# Patient Record
Sex: FEMALE | Race: OTHER | NOT HISPANIC OR LATINO | URBAN - METROPOLITAN AREA
[De-identification: names, ages, dates, MRNs, and addresses within clinical notes are randomized per-mention and may not be internally consistent; named-entity substitution may affect disease eponyms.]

---

## 2017-03-15 ENCOUNTER — EMERGENCY (EMERGENCY)
Facility: HOSPITAL | Age: 47
LOS: 1 days | Discharge: LEFT BEFORE DISCHARGE | End: 2017-03-15
Admitting: EMERGENCY MEDICINE

## 2017-03-15 VITALS
TEMPERATURE: 98 F | HEART RATE: 80 BPM | OXYGEN SATURATION: 98 % | SYSTOLIC BLOOD PRESSURE: 158 MMHG | RESPIRATION RATE: 16 BRPM | DIASTOLIC BLOOD PRESSURE: 91 MMHG

## 2017-03-15 NOTE — ED ADULT TRIAGE NOTE - CHIEF COMPLAINT QUOTE
states" I have chills and pain to my left side and to my right upper back since more than a week"  seen by PMD with antibiotics with no relief. c/o burning on urination.

## 2018-10-24 ENCOUNTER — INPATIENT (INPATIENT)
Facility: HOSPITAL | Age: 48
LOS: 1 days | Discharge: HOME | DRG: 287 | End: 2018-10-26
Attending: HOSPITALIST | Admitting: HOSPITALIST
Payer: MEDICAID

## 2018-10-24 VITALS
HEIGHT: 66 IN | OXYGEN SATURATION: 100 % | WEIGHT: 160.06 LBS | HEART RATE: 70 BPM | SYSTOLIC BLOOD PRESSURE: 154 MMHG | RESPIRATION RATE: 19 BRPM | DIASTOLIC BLOOD PRESSURE: 80 MMHG | TEMPERATURE: 98 F

## 2018-10-24 LAB
ALBUMIN SERPL ELPH-MCNC: 4.7 G/DL — SIGNIFICANT CHANGE UP (ref 3.3–5)
ALP SERPL-CCNC: 50 U/L — SIGNIFICANT CHANGE UP (ref 40–120)
ALT FLD-CCNC: 12 U/L — SIGNIFICANT CHANGE UP (ref 10–45)
ANION GAP SERPL CALC-SCNC: 12 MMOL/L — SIGNIFICANT CHANGE UP (ref 5–17)
APTT BLD: 30.5 SEC — SIGNIFICANT CHANGE UP (ref 27.5–37.4)
AST SERPL-CCNC: 16 U/L — SIGNIFICANT CHANGE UP (ref 10–40)
BASE EXCESS BLDV CALC-SCNC: 1.3 MMOL/L — SIGNIFICANT CHANGE UP (ref -2–2)
BILIRUB SERPL-MCNC: 0.3 MG/DL — SIGNIFICANT CHANGE UP (ref 0.2–1.2)
BUN SERPL-MCNC: 14 MG/DL — SIGNIFICANT CHANGE UP (ref 7–23)
CA-I SERPL-SCNC: 1.34 MMOL/L — HIGH (ref 1.12–1.3)
CALCIUM SERPL-MCNC: 11 MG/DL — HIGH (ref 8.4–10.5)
CHLORIDE BLDV-SCNC: 107 MMOL/L — SIGNIFICANT CHANGE UP (ref 96–108)
CHLORIDE SERPL-SCNC: 103 MMOL/L — SIGNIFICANT CHANGE UP (ref 96–108)
CO2 BLDV-SCNC: 30 MMOL/L — SIGNIFICANT CHANGE UP (ref 22–30)
CO2 SERPL-SCNC: 24 MMOL/L — SIGNIFICANT CHANGE UP (ref 22–31)
CREAT SERPL-MCNC: 0.61 MG/DL — SIGNIFICANT CHANGE UP (ref 0.5–1.3)
GAS PNL BLDV: 135 MMOL/L — LOW (ref 136–145)
GAS PNL BLDV: SIGNIFICANT CHANGE UP
GLUCOSE BLDV-MCNC: 87 MG/DL — SIGNIFICANT CHANGE UP (ref 70–99)
GLUCOSE SERPL-MCNC: 96 MG/DL — SIGNIFICANT CHANGE UP (ref 70–99)
HCO3 BLDV-SCNC: 28 MMOL/L — SIGNIFICANT CHANGE UP (ref 21–29)
HCT VFR BLD CALC: 47.7 % — HIGH (ref 34.5–45)
HCT VFR BLDA CALC: 48 % — SIGNIFICANT CHANGE UP (ref 39–50)
HGB BLD CALC-MCNC: 15.8 G/DL — HIGH (ref 11.5–15.5)
HGB BLD-MCNC: 15.8 G/DL — HIGH (ref 11.5–15.5)
INR BLD: 0.89 RATIO — SIGNIFICANT CHANGE UP (ref 0.88–1.16)
LACTATE BLDV-MCNC: 1 MMOL/L — SIGNIFICANT CHANGE UP (ref 0.7–2)
MCHC RBC-ENTMCNC: 30.5 PG — SIGNIFICANT CHANGE UP (ref 27–34)
MCHC RBC-ENTMCNC: 33.1 GM/DL — SIGNIFICANT CHANGE UP (ref 32–36)
MCV RBC AUTO: 92.2 FL — SIGNIFICANT CHANGE UP (ref 80–100)
PCO2 BLDV: 53 MMHG — HIGH (ref 35–50)
PH BLDV: 7.34 — LOW (ref 7.35–7.45)
PLATELET # BLD AUTO: 311 K/UL — SIGNIFICANT CHANGE UP (ref 150–400)
PO2 BLDV: 23 MMHG — LOW (ref 25–45)
POTASSIUM BLDV-SCNC: 4 MMOL/L — SIGNIFICANT CHANGE UP (ref 3.5–5.3)
POTASSIUM SERPL-MCNC: 4.3 MMOL/L — SIGNIFICANT CHANGE UP (ref 3.5–5.3)
POTASSIUM SERPL-SCNC: 4.3 MMOL/L — SIGNIFICANT CHANGE UP (ref 3.5–5.3)
PROT SERPL-MCNC: 7.4 G/DL — SIGNIFICANT CHANGE UP (ref 6–8.3)
PROTHROM AB SERPL-ACNC: 9.7 SEC — LOW (ref 9.8–12.7)
RBC # BLD: 5.17 M/UL — SIGNIFICANT CHANGE UP (ref 3.8–5.2)
RBC # FLD: 13 % — SIGNIFICANT CHANGE UP (ref 10.3–14.5)
SAO2 % BLDV: 45 % — LOW (ref 67–88)
SODIUM SERPL-SCNC: 139 MMOL/L — SIGNIFICANT CHANGE UP (ref 135–145)
TROPONIN T, HIGH SENSITIVITY RESULT: 10 NG/L — SIGNIFICANT CHANGE UP (ref 0–51)
TROPONIN T, HIGH SENSITIVITY RESULT: 11 NG/L — SIGNIFICANT CHANGE UP (ref 0–51)
WBC # BLD: 10 K/UL — SIGNIFICANT CHANGE UP (ref 3.8–10.5)
WBC # FLD AUTO: 10 K/UL — SIGNIFICANT CHANGE UP (ref 3.8–10.5)

## 2018-10-24 PROCEDURE — 71046 X-RAY EXAM CHEST 2 VIEWS: CPT | Mod: 26

## 2018-10-24 PROCEDURE — 99220: CPT

## 2018-10-24 PROCEDURE — 93010 ELECTROCARDIOGRAM REPORT: CPT

## 2018-10-24 RX ORDER — SODIUM CHLORIDE 9 MG/ML
3 INJECTION INTRAMUSCULAR; INTRAVENOUS; SUBCUTANEOUS EVERY 8 HOURS
Refills: 0 | Status: DISCONTINUED | OUTPATIENT
Start: 2018-10-24 | End: 2018-10-26

## 2018-10-24 RX ORDER — SODIUM CHLORIDE 9 MG/ML
1000 INJECTION INTRAMUSCULAR; INTRAVENOUS; SUBCUTANEOUS ONCE
Refills: 0 | Status: COMPLETED | OUTPATIENT
Start: 2018-10-24 | End: 2018-10-24

## 2018-10-24 RX ORDER — ASPIRIN/CALCIUM CARB/MAGNESIUM 324 MG
325 TABLET ORAL ONCE
Refills: 0 | Status: COMPLETED | OUTPATIENT
Start: 2018-10-24 | End: 2018-10-24

## 2018-10-24 RX ADMIN — Medication 325 MILLIGRAM(S): at 23:40

## 2018-10-24 RX ADMIN — SODIUM CHLORIDE 1000 MILLILITER(S): 9 INJECTION INTRAMUSCULAR; INTRAVENOUS; SUBCUTANEOUS at 23:41

## 2018-10-24 RX ADMIN — SODIUM CHLORIDE 3 MILLILITER(S): 9 INJECTION INTRAMUSCULAR; INTRAVENOUS; SUBCUTANEOUS at 22:37

## 2018-10-24 NOTE — ED CDU PROVIDER DISPOSITION NOTE - CLINICAL COURSE
48 yo female with no PMHx p/w cp and sob.  The patient reports that she has had worsening SOB x 1 week and now with CP for the last 4 days.  Symptoms are worse with exertion and deep breath.  Associated with intermittent cough.  CP is b/l upper chest radiating to the neck and jaw, ache, pressing and tightness, was a/w nausea and diaphoresis last night, was 8/10 currently 2/10. Denies fevers/chills, lightheadedness, dizziness, palpitations, tearing/ripping pain, abd pain, n/v/d/c, dysuria, recent URI, recent long car/plane rides, hormonal preparations, CA, personal fh/o DVT/PE.  Patient is a daily smoker (1ppd x 30+ years).  Strong family hx of CAD with father and sister with MI in their 40s, father later  of MI during stress test. No previous cardiac work up.  PCP: Dr. Patel  ED Course: CBC, CMP, Coags, VBG all non-acitonable. HST 11 --> 10 (2 hr), CXR unremarkable. Pt sent to CDU for tele, CTC, freq eval, and pain control.  CTC in CDU _______________. 48 yo female with no PMHx p/w cp and sob.  The patient reports that she has had worsening SOB x 1 week and now with CP for the last 4 days.  Symptoms are worse with exertion and deep breath.  Associated with intermittent cough.  CP is b/l upper chest radiating to the neck and jaw, ache, pressing and tightness, was a/w nausea and diaphoresis last night, was 8/10 currently 2/10. Denies fevers/chills, lightheadedness, dizziness, palpitations, tearing/ripping pain, abd pain, n/v/d/c, dysuria, recent URI, recent long car/plane rides, hormonal preparations, CA, personal fh/o DVT/PE.  Patient is a daily smoker (1ppd x 30+ years).  Strong family hx of CAD with father and sister with MI in their 40s, father later  of MI during stress test. No previous cardiac work up.  PCP: Dr. Patel  ED Course: CBC, CMP, Coags, VBG all non-acitonable. HST 11 --> 10 (2 hr), CXR unremarkable. Pt sent to CDU for tele, CTC, freq eval, and pain control.  CTC in CDU, 60-70% plaque in LAD. discussed with Cardiologist, pt to be admitted for CATH tomorrow.

## 2018-10-24 NOTE — ED PROVIDER NOTE - ATTENDING CONTRIBUTION TO CARE
46 yo female with no PMHx p/w cp and sob.  The patient reports that she has had worsening SOB x 1 week and now with Chest tightness for the last 4 days, heavy smoker, family history, ekg nl, labs nl, to admit to cdu for ct coronary vs provacative testing.

## 2018-10-24 NOTE — ED ADULT NURSE REASSESSMENT NOTE - NS ED NURSE REASSESS COMMENT FT1
19.00 Received the pt from main ED for the management of CP pt is oriented to the unit meals provided  safety  & comfort measures initiated  Continue to monitor
Pt received from SABRINA Morris. Pt oriented to CDU & plan of care was discussed. Pt endorses 2/10 chest pain described as pressure as if someone was sitting on the chest. MARIA C Sierra aware. Pt also endorses SOB. Pt denies any dizziness or palpitations. Safety & comfort measures maintained. Call bell in reach. Will continue to monitor.

## 2018-10-24 NOTE — ED CDU PROVIDER INITIAL DAY NOTE - PHYSICAL EXAMINATION
GEN: Pt in NAD, A&O x3.   EYES: Sclera white w/o injection, PERRLA.   ENT: Head NCAT. Nose without deformity, turbinates without edema or erythema, no DC. No auricular TTP. Mouth and pharynx without erythema or exudates, uvula midline, no tonsillar enlargement. Neck supple FROM.   RESP: No chest wall tenderness, CTA b/l, no wheezes, rales, or rhonchi.   CARDIAC: RRR, clear distinct S1, S2, no S3, S4, murmurs, gallops, or rubs.   ABD: Abdomen non-distended, soft, non-tender, no rebound or guarding, organomegaly or masses. No CVAT b/l.  VASC: 2+ carotid, radial, and dorsalis pedis pulses b/l. No edema or tenderness of the lower extremities.  NEURO: No focal sensory or neuro deficits.  SKIN: No rashes on the trunk.

## 2018-10-24 NOTE — ED ADULT NURSE NOTE - OBJECTIVE STATEMENT
47y f pt current smoker c/o waking this am with a feeling of pressure on her chest; + sob; + intermittent diaphoresis; pt also states has a family hx of early cardiac events; pt has no personal hx of cardiac events; aox3; no resp distress; no sob at this time; no diaphoresis at this time; pt states still having chest pain; now radiating to left arm; + cough without production; no congestion; denies fever/chills; denies numbness/tingling; ekg done; pt placed on cardiac monitor; nsr on monitor; iv placed; labs drawn; pt states took 325mg of aspirin last night; no aspirin today; family at bedside; safety/comfort maintained

## 2018-10-24 NOTE — ED CDU PROVIDER DISPOSITION NOTE - ATTENDING CONTRIBUTION TO CARE
Attending MD Iyer:   I personally have seen and examined this patient.  Physician assistant note reviewed and agree on plan of care and except where noted.  See below for details.     Seen in CDU 8    47F with no reported PMH, active smoker sent to the CDU for CT coronary and telemetry after presenting to the ED with chest pain and shortness of breath, worsened with exertion.  Patient seen in the CDU preceding and following CT coronary.  At time of evaluation prior to CT coronary both patient and family demanding to know exact time of CT Coronary or threatening to leave.  Explained to patient that unable to provide her an exact time and she would have to leave AMA if she did leave.  Patient still reporting chest tightness/chest pain and shortness of breath prior to CT coronary.  Reports strong family history.  Declines discussion about quitting tobacco at time of my evaluation.  Reports chest tightness is unchanged from that which brought her in and worsens with exertion.  On exam, NAD, AAOx3, head NCAT, PERRL, FROM at neck, no tenderness to midline palpation, no stepoffs along length of spine, lungs with scattered wheezing bilaterally with good inspiratory effort, +S1S2, no m/r/g, abdomen soft with +BS, NT, ND, no CVAT, moving all extremities with 5/5 strength bilateral upper and lower extremities, good and equal  strength bilaterally, no lower extremity edema, +2 distal pulses; A/P: 47F with chest tightness and shortness of breath, worse with exertion, declined neb for wheezing, reports wheezes at baseline.  Discussed findings of labs and radiology with patient after CT coronary. Seen by cardiology. CT coronary abnormal, amenable to admission for cath.

## 2018-10-24 NOTE — ED CDU PROVIDER INITIAL DAY NOTE - ATTENDING CONTRIBUTION TO CARE
46 yo female with no PMHx p/w cp and sob.  pt signed out to me for plan to send to cdu for ct coronary, cardiac monitoring.

## 2018-10-24 NOTE — ED CDU PROVIDER INITIAL DAY NOTE - OBJECTIVE STATEMENT
48 yo female with no PMHx p/w cp and sob.  The patient reports that she has had worsening SOB x 1 week and now with CP for the last 4 days.  Symptoms are worse with exertion and deep breath.  Associated with intermittent cough.  CP is b/l upper chest radiating to the neck and jaw, ache, pressing and tightness, was a/w nausea and diaphoresis last night, was 8/10 currently 2/10. Denies fevers/chills, lightheadedness, dizziness, palpitations, tearing/ripping pain, abd pain, n/v/d/c, dysuria, recent URI, recent long car/plane rides, hormonal preparations, CA, personal fh/o DVT/PE.  Patient is a daily smoker (1ppd x 30+ years).  Strong family hx of CAD with father and sister with MI in their 40s, father later  of MI during stress test. No previous cardiac work up.  PCP: Dr. Patel  ED Course: CBC, CMP, Coags, VBG all non-acitonable. HST 11 --> 10 (2 hr), CXR unremarkable. Pt sent to CDU for tele, CTC, freq eval, and pain control

## 2018-10-24 NOTE — ED ADULT TRIAGE NOTE - CHIEF COMPLAINT QUOTE
chest pain/sob x 1 wk, worse x 3 days, +smoker, (denies long distance travel/not on hormone therapy)

## 2018-10-24 NOTE — ED ADULT NURSE NOTE - NSIMPLEMENTINTERV_GEN_ALL_ED
Implemented All Universal Safety Interventions:  [place] to call system. Call bell, personal items and telephone within reach. Instruct patient to call for assistance. Room bathroom lighting operational. Non-slip footwear when patient is off stretcher. Physically safe environment: no spills, clutter or unnecessary equipment. Stretcher in lowest position, wheels locked, appropriate side rails in place.

## 2018-10-24 NOTE — ED PROVIDER NOTE - OBJECTIVE STATEMENT
48 yo female with no PMHx p/w cp and sob.  The patient reports that she has had worsening SOB x 1 week and now with Chest tightness for the last 4 days.  Symptoms are worse with exertion.  Associated with intermittent cough.  Denies fevers/chills, abd pain, NVDC, dysuria.  Patient is a daily smoker (1ppd x 30+ years).  Strong family hx of CAD with father and sister with MI in their 40s. No previous cardiac work up.

## 2018-10-24 NOTE — ED CDU PROVIDER DISPOSITION NOTE - PLAN OF CARE
1. Recommend Aspirin 81mg over the counter daily until further evaluation.    2. Follow up with your PMD and cardiologist or our cardiology clinic ([phone]) within 48-72 hours. Show copies of your reports given to you.   3. Worsening or continued chest pain, shortness of breath, weakness, return to ED.

## 2018-10-25 DIAGNOSIS — R07.9 CHEST PAIN, UNSPECIFIED: ICD-10-CM

## 2018-10-25 DIAGNOSIS — E83.52 HYPERCALCEMIA: ICD-10-CM

## 2018-10-25 DIAGNOSIS — Z29.9 ENCOUNTER FOR PROPHYLACTIC MEASURES, UNSPECIFIED: ICD-10-CM

## 2018-10-25 DIAGNOSIS — R03.0 ELEVATED BLOOD-PRESSURE READING, WITHOUT DIAGNOSIS OF HYPERTENSION: ICD-10-CM

## 2018-10-25 DIAGNOSIS — I25.10 ATHEROSCLEROTIC HEART DISEASE OF NATIVE CORONARY ARTERY WITHOUT ANGINA PECTORIS: ICD-10-CM

## 2018-10-25 DIAGNOSIS — F17.200 NICOTINE DEPENDENCE, UNSPECIFIED, UNCOMPLICATED: ICD-10-CM

## 2018-10-25 LAB
CHOLEST SERPL-MCNC: 177 MG/DL — SIGNIFICANT CHANGE UP (ref 10–199)
HBA1C BLD-MCNC: 5.5 % — SIGNIFICANT CHANGE UP (ref 4–5.6)
HDLC SERPL-MCNC: 47 MG/DL — LOW
LIPID PNL WITH DIRECT LDL SERPL: 108 MG/DL — SIGNIFICANT CHANGE UP
TOTAL CHOLESTEROL/HDL RATIO MEASUREMENT: 3.8 RATIO — SIGNIFICANT CHANGE UP (ref 3.3–7.1)
TRIGL SERPL-MCNC: 111 MG/DL — SIGNIFICANT CHANGE UP (ref 10–149)

## 2018-10-25 PROCEDURE — 75574 CT ANGIO HRT W/3D IMAGE: CPT | Mod: 26

## 2018-10-25 PROCEDURE — 99223 1ST HOSP IP/OBS HIGH 75: CPT

## 2018-10-25 PROCEDURE — 99217: CPT

## 2018-10-25 PROCEDURE — 93306 TTE W/DOPPLER COMPLETE: CPT | Mod: 26

## 2018-10-25 RX ORDER — NICOTINE POLACRILEX 2 MG
1 GUM BUCCAL DAILY
Refills: 0 | Status: DISCONTINUED | OUTPATIENT
Start: 2018-10-25 | End: 2018-10-26

## 2018-10-25 RX ORDER — ENOXAPARIN SODIUM 100 MG/ML
40 INJECTION SUBCUTANEOUS EVERY 24 HOURS
Refills: 0 | Status: DISCONTINUED | OUTPATIENT
Start: 2018-10-25 | End: 2018-10-26

## 2018-10-25 RX ORDER — METOPROLOL TARTRATE 50 MG
12.5 TABLET ORAL
Refills: 0 | Status: DISCONTINUED | OUTPATIENT
Start: 2018-10-25 | End: 2018-10-26

## 2018-10-25 RX ORDER — ISOSORBIDE MONONITRATE 60 MG/1
30 TABLET, EXTENDED RELEASE ORAL DAILY
Refills: 0 | Status: DISCONTINUED | OUTPATIENT
Start: 2018-10-25 | End: 2018-10-25

## 2018-10-25 RX ORDER — AMLODIPINE BESYLATE 2.5 MG/1
5 TABLET ORAL DAILY
Refills: 0 | Status: DISCONTINUED | OUTPATIENT
Start: 2018-10-25 | End: 2018-10-26

## 2018-10-25 RX ORDER — METOPROLOL TARTRATE 50 MG
50 TABLET ORAL ONCE
Refills: 0 | Status: COMPLETED | OUTPATIENT
Start: 2018-10-25 | End: 2018-10-25

## 2018-10-25 RX ORDER — ATORVASTATIN CALCIUM 80 MG/1
80 TABLET, FILM COATED ORAL DAILY
Refills: 0 | Status: DISCONTINUED | OUTPATIENT
Start: 2018-10-25 | End: 2018-10-26

## 2018-10-25 RX ORDER — ASPIRIN/CALCIUM CARB/MAGNESIUM 324 MG
81 TABLET ORAL DAILY
Refills: 0 | Status: DISCONTINUED | OUTPATIENT
Start: 2018-10-25 | End: 2018-10-26

## 2018-10-25 RX ORDER — DIPHENHYDRAMINE HCL 50 MG
50 CAPSULE ORAL ONCE
Refills: 0 | Status: COMPLETED | OUTPATIENT
Start: 2018-10-25 | End: 2018-10-25

## 2018-10-25 RX ADMIN — SODIUM CHLORIDE 3 MILLILITER(S): 9 INJECTION INTRAMUSCULAR; INTRAVENOUS; SUBCUTANEOUS at 08:12

## 2018-10-25 RX ADMIN — AMLODIPINE BESYLATE 5 MILLIGRAM(S): 2.5 TABLET ORAL at 14:25

## 2018-10-25 RX ADMIN — SODIUM CHLORIDE 3 MILLILITER(S): 9 INJECTION INTRAMUSCULAR; INTRAVENOUS; SUBCUTANEOUS at 13:41

## 2018-10-25 RX ADMIN — Medication 50 MILLIGRAM(S): at 03:40

## 2018-10-25 RX ADMIN — ATORVASTATIN CALCIUM 80 MILLIGRAM(S): 80 TABLET, FILM COATED ORAL at 21:35

## 2018-10-25 RX ADMIN — Medication 12.5 MILLIGRAM(S): at 21:35

## 2018-10-25 RX ADMIN — SODIUM CHLORIDE 3 MILLILITER(S): 9 INJECTION INTRAMUSCULAR; INTRAVENOUS; SUBCUTANEOUS at 21:19

## 2018-10-25 RX ADMIN — Medication 50 MILLIGRAM(S): at 09:20

## 2018-10-25 RX ADMIN — Medication 81 MILLIGRAM(S): at 14:25

## 2018-10-25 NOTE — H&P ADULT - NSHPLABSRESULTS_GEN_ALL_CORE
(10-24 @ 16:05)                      15.8  10.0 )-----------( 311                 47.7    Neutrophils = -- (--%)  Lymphocytes = -- (--%)  Eosinophils = -- (--%)  Basophils = -- (--%)  Monocytes = -- (--%)  Bands = --%    10-24    139  |  103  |  14  ----------------------------<  96  4.3   |  24  |  0.61    Ca    11.0<H>      24 Oct 2018 16:05    TPro  7.4  /  Alb  4.7  /  TBili  0.3  /  DBili  x   /  AST  16  /  ALT  12  /  AlkPhos  50  10-24    ( 24 Oct 2018 16:05 )   PT: 9.7 sec;   INR: 0.89 ratio;       PTT:30.5 sec        Venous Blood Gas:  10-24 @ 16:05  7.34/53/23/28/45  VBG Lactate: 1.0    Labs personally reviewed: Hypercalcemia to 11. No significant electrolyte disturbances, no anemia  Imaging personally reviewed: CXR w/ clear lungs, no consolidations or effusions  EKG personally reviewed: NSR 63 bpm w/ flattened T wave in aVL, QTc 364  Consultants' notes reviewed: cardiology

## 2018-10-25 NOTE — H&P ADULT - ASSESSMENT
46 y/o woman w/ no significant PMH but active smoker p/w stable angina and found to have 70% LAD lesion on CT coronaries.

## 2018-10-25 NOTE — H&P ADULT - NSHPREVIEWOFSYSTEMS_GEN_ALL_CORE
General:	No fevers, chills, or decreased appetite. +weight gain    Skin/Breast: No rashes or bruising  	  Ophthalmologic: No change in vision or eye pain  	  ENMT: No sore throat or dry mouth    Respiratory and Thorax: No SOB or cough  	  Cardiovascular: +chest pain, no palpitations or LE edema    Gastrointestinal: +nausea, No abdominal pain, vomiting, or diarrhea    Genitourinary: No dysuria or polyuria    Musculoskeletal: No joint swelling or erythema    Neurological: No headaches or new neurological deficits    Psychiatric: No depression or anxiety    Hematology/Lymphatics: No bruising

## 2018-10-25 NOTE — H&P ADULT - PROBLEM SELECTOR PLAN 3
BP has fluctuated, SBP 100s-150s.   - Started on amlodipine yesterday  - f/u cardiology, depending on HR and results of cath/TTE, may be better to change to ACE-I or even beta blocker BP has fluctuated, SBP 100s-150s.   - Started on amlodipine yesterday  - Start metoprolol as above  - Monitor vitals

## 2018-10-25 NOTE — H&P ADULT - NSHPPHYSICALEXAM_GEN_ALL_CORE
Vital Signs Last 24 Hrs  T(C): 36.3 (25 Oct 2018 12:19), Max: 37.1 (25 Oct 2018 03:45)  T(F): 97.4 (25 Oct 2018 12:19), Max: 98.8 (25 Oct 2018 03:45)  HR: 68 (25 Oct 2018 12:19) (61 - 81)  BP: 155/93 (25 Oct 2018 12:19) (98/66 - 155/93)  BP(mean): --  RR: 17 (25 Oct 2018 12:19) (17 - 19)  SpO2: 97% (25 Oct 2018 12:19) (94% - 100%)    PHYSICAL EXAM:  Constitutional: Well-appearing, NAD, sitting in chair, appears stated age    Eyes: EOMI, PERRLA, clear conjunctiva    ENMT: No pharyngeal erythema, mucus membranes moist    Neck: No JVD    Back: No spinal tenderness or kyphosis    Respiratory: Lungs clear to auscultation     Cardiovascular: Regular rate and rhythm, S1S2+, no murmurs appreciated. No LE edema    Gastrointestinal: Soft, non-tender, non-distended, bowel sounds positive all four quadrants    Extremities: no clubbing or cyanosis    Vascular: 2+ pulses radially and dorsalis pedis    Neurological: Alert and oriented to name, place, and date. Cranial nerves II-XII intact. No focal neurological deficits. 5/5 motor strength all four extremities    Skin: Warm and dry.  No rashes    Lymph Nodes: No cervical lymphadenopathy    Musculoskeletal: No joint swelling or erythema    Psychiatric: Pleasant affect

## 2018-10-25 NOTE — ED CDU PROVIDER SUBSEQUENT DAY NOTE - PROGRESS NOTE DETAILS
CDU NOTE MARIA C HWANG: Pt resting comfortably, feeling well without complaint aside from inability to fall asleep. states lightheadedness resolved. denies CP but states she has a scratching/itchy sensation in chest. NAD, VSS. No events on telemetry. will give benadryl and continue monitoring. CDU NOTE MARIA C Schneider: pt resting comfortably, feels well, no cp, no sob/dyspnea. NAD recent VSS. no events on tele. CV/lungs- +S1, S2, no murmur. lungs- mild diffuse wheeze. pt offered neb treatment, pt declined. pt reports "I am a smoker, that's why I sound like that." pt states she is at her baseline. CDU NOTE MARIA C Schneider: spoke to the Nocturnist Cardiologist, TTE can be done outpt. pt will get CTC here during her stay. CDU NOTE MARIA C Schneider: pt resting comfortably, feels well without complaint. had episode of CP earlier but resolved. no other complaints. no cp now. NAD recent VSS. no events on tele.   pt evaluated by Cardiologist for abnormal CT Coronaries. pt to be admitted for Cath.

## 2018-10-25 NOTE — H&P ADULT - PROBLEM SELECTOR PLAN 2
Patient reports that her PCP told her she had "high calcium" last year. No further workup as far as she knows.   - Check PTH, Vit D-25OH and TSH  - Trend BMP  - Outpatient f/u

## 2018-10-25 NOTE — H&P ADULT - PROBLEM SELECTOR PLAN 4
Patient active smoker. She is contemplating quitting and interested in having more resources to quit.   - Start nicotine patch 21 mg/24 hr    Face-to-face smoking cessation counseling 8 minutes.

## 2018-10-25 NOTE — CONSULT NOTE ADULT - SUBJECTIVE AND OBJECTIVE BOX
CARDIOLOGY CONSULTATION NOTE                                                                                             Consult requested by:  emergency department    Reason for Consultation: chest pain    History obtained by: Patient and medical record     obtained: No    Chief complaint:    Patient is a 47y old  Female who presents with a chief complaint of chest pain x 4 days.     HPI:  47yr old female no pmhx but is an active smoker for 30yrs 1 PPD presents c/o chest pain for last 4 days. States pain feels more like a heaviness all over her chest, radiating to her neck and jaw. Waxing and waning in nature, associated with mild dizziness and nausea. No LOC, dizziness, palpitations, shortness of breath. Patient came to ED today because heaviness awoke her from sleep and was found to be diaphoretic. She is able to climb to two flights of stairs and has been going to work daily.       REVIEW OF SYMPTOMS: Cardiovascular:  See HPI. + chest pain,  No dyspnea,  No syncope,  No palpitations, + dizziness, No Orthopnea,      No Paroxsymal nocturnal dyspnea;  Respiratory:  No Dyspnea, No cough,     Genitourinary:  No dysuria, no hematuria; Gastrointestinal:  + nausea, no vomiting. No diarrhea.  No abdominal pain. No dark color stool, no melena ; Neurological: No headache, no dizziness, no slurred speech;  Psychiatric: No agitation, no anxiety.  ALL OTHER REVIEW OF SYSTEMS ARE NEGATIVE.    ALLERGIES: Allergies    No Known Allergies    Intolerances          CURRENT MEDICATIONS:     sodium chloride 0.9% lock flush      HOME MEDICATIONS:  none    PAST MEDICAL HISTORY  No pertinent past medical history      PAST SURGICAL HISTORY  Delivery by  section of full-term infant      FAMILY HISTORY:  Family history of myocardial infarction (Father 30's, brother 40's, sister 40's)      SOCIAL HISTORY:  active smoker 1ppd x 40yrs        Vital Signs Last 24 Hrs  T(C): 37 (24 Oct 2018 23:28), Max: 37 (24 Oct 2018 18:46)  T(F): 98.6 (24 Oct 2018 23:28), Max: 98.6 (24 Oct 2018 18:46)  HR: 76 (24 Oct 2018 23:28) (61 - 76)  BP: 98/66 (24 Oct 2018 23:28) (98/66 - 154/80)  BP(mean): --  RR: 18 (24 Oct 2018 23:28) (18 - 19)  SpO2: 95% (24 Oct 2018 23:28) (95% - 100%)      PHYSICAL EXAM:  Constitutional: Comfortable . No acute distress.   HEENT: Atraumatic and normcephalic , neck is supple . no JVD. No carotid bruit. PEERL   CNS: A&Ox3. No focal deficits. EOMI.   Lymph Nodes: Cervical : Not palpable.  Respiratory: CTAB  Cardiovascular: S1S2 RRR. No murmur/rubs or gallop.  Gastrointestinal: Soft non-tender and non distended . +Bowel sounds.   Extremities: No edema.   Psychiatric: Calm . no agitation.  Skin: No skin rash/ulcers visualized to face, hands or feet.    Intake and output:     LABS:                        15.8   10.0  )-----------( 311      ( 24 Oct 2018 16:05 )             47.7     10-24    139  |  103  |  14  ----------------------------<  96  4.3   |  24  |  0.61    Ca    11.0<H>      24 Oct 2018 16:05    TPro  7.4  /  Alb  4.7  /  TBili  0.3  /  DBili  x   /  AST  16  /  ALT  12  /  AlkPhos  50  10-      ;p-BNP=  PT/INR - ( 24 Oct 2018 16:05 )   PT: 9.7 sec;   INR: 0.89 ratio         PTT - ( 24 Oct 2018 16:05 )  PTT:30.5 sec      INTERPRETATION OF TELEMETRY: Reviewed by me.   ECG: Reviewed by me. sinus rhythm    RADIOLOGY & ADDITIONAL STUDIES:    X-ray:  reviewed by me. no vascular congestion, effusion, cardiomegaly

## 2018-10-25 NOTE — H&P ADULT - PROBLEM SELECTOR PLAN 1
Evidence of 70% LAD blockage on CT coronaries, which is likely culprit for patient's anginal symptoms over the past few days. Cardiology f/u appreciated.   - Plan for cardiac cath tomorrow   - Start ASA 81 mg daily  - Start lipitor 80 mg qhs  - Check TTE  - Check lipid panel  - Tele monitoring Evidence of 70% LAD blockage on CT coronaries, which is likely culprit for patient's anginal symptoms over the past few days. Cardiology f/u appreciated.   - Plan for cardiac cath tomorrow   - Start ASA 81 mg daily  - Start lipitor 80 mg qhs  - Start metoprolol 12.5 mg BID w/ hold parameters  - Check TTE  - Check lipid panel  - Tele monitoring

## 2018-10-25 NOTE — H&P ADULT - FAMILY HISTORY
Sibling  Still living? Unknown  Family history of myocardial infarction, Age at diagnosis: Age Unknown     Father  Still living? No  Family history of myocardial infarction, Age at diagnosis: Age Unknown

## 2018-10-25 NOTE — H&P ADULT - HISTORY OF PRESENT ILLNESS
48 y/o woman w/ no significant PMH but active smoker p/w progressive chest pressure x 4 days. Patient describes the pain as substernal and occasionally crushing in nature. She never experienced similar symptoms in the past. It is b/l and radiates to her neck. She also endorses new WOO in the same time period, with increasing difficulty climbing up two flights of stairs, which normally causes her no problems. Patient came to the hospital yesterday morning after waking up in the middle of the night with severe chest pain and diaphoresis. She took an aspirin with relief of her symptoms. ROS also notable for nausea and dizziness. Patient initially admitted to CDU for further workup. CT coronaries revealed 70% LAD lesion.    Of note, patient has a strong family h/o CAD. Her father  of sudden cardiac death in his 30s, her brother of sudden cardiac death in his 40s, and her sister had a stent placed at age 52. She smokers ~1/2 PPD for the past 15 years, reports she is interested in quitting.

## 2018-10-25 NOTE — CONSULT NOTE ADULT - ASSESSMENT
47yr old female no pmhx but is an active smoker for 30yrs 1 PPD presents c/o chest pain for last 4 days radiating to neck and jaw.

## 2018-10-25 NOTE — CONSULT NOTE ADULT - ATTENDING COMMENTS
Thank you. My team will follow.    Monika Montana M.D, F.A.C.C  [facility]   [phone]

## 2018-10-25 NOTE — CONSULT NOTE ADULT - PROBLEM SELECTOR RECOMMENDATION 9
HEART Score=4, Coronary CTA r/o CAD. TTE for evaluation of LV function and r/o valvular abnormalities. Continue telemetry monitoring. EKG negative ischemia, cardiac enzymes x 2 negative. Check BNP and fasting lipid profile.

## 2018-10-25 NOTE — ED CDU PROVIDER SUBSEQUENT DAY NOTE - HISTORY
No interval changes since initial CDU provider note. Pt feels well without complaint. No CP now. states she does feel a little lightheaded though. NAD, VSS. no events on tele. trops negative x 2. nocturnist cardio saw pt, agree with CTC for AM. will give IVFs and continue monitoring. - MARIA C Reeves

## 2018-10-25 NOTE — ED CDU PROVIDER SUBSEQUENT DAY NOTE - ATTENDING CONTRIBUTION TO CARE
Attending MD Iyer:   I personally have seen and examined this patient.  Physician assistant note reviewed and agree on plan of care and except where noted.  See below for details.     Seen in CDU 8    47F with no reported PMH, active smoker sent to the CDU for CT coronary and telemetry after presenting to the ED with chest pain and shortness of breath, worsened with exertion.  Patient seen in the CDU preceding and following CT coronary.  At time of evaluation prior to CT coronary both patient and family demanding to know exact time of CT Coronary or threatening to leave.  Explained to patient that unable to provide her an exact time and she would have to leave AMA if she did leave.  Patient still reporting chest tightness/chest pain and shortness of breath prior to CT coronary.  Reports strong family history.  Declines discussion about quitting tobacco at time of my evaluation.  Reports chest tightness is unchanged from that which brought her in and worsens with exertion.  On exam, NAD, AAOx3, head NCAT, PERRL, FROM at neck, no tenderness to midline palpation, no stepoffs along length of spine, lungs with scattered wheezing bilaterally with good inspiratory effort, +S1S2, no m/r/g, abdomen soft with +BS, NT, ND, no CVAT, moving all extremities with 5/5 strength bilateral upper and lower extremities, good and equal  strength bilaterally, no lower extremity edema; A/P: 47F with chest tightness and shortness of breath, worse with exertion, declined neb for wheezing, reports wheezes at baseline.  Discussed findings of labs and radiology with patient after CT coronary. Seen by cardiology. CT coronary abnormal, amenable to admission for cath.

## 2018-10-26 ENCOUNTER — TRANSCRIPTION ENCOUNTER (OUTPATIENT)
Age: 48
End: 2018-10-26

## 2018-10-26 VITALS
TEMPERATURE: 98 F | SYSTOLIC BLOOD PRESSURE: 117 MMHG | HEART RATE: 74 BPM | OXYGEN SATURATION: 96 % | RESPIRATION RATE: 18 BRPM | DIASTOLIC BLOOD PRESSURE: 64 MMHG

## 2018-10-26 LAB
ANION GAP SERPL CALC-SCNC: 11 MMOL/L — SIGNIFICANT CHANGE UP (ref 5–17)
BUN SERPL-MCNC: 18 MG/DL — SIGNIFICANT CHANGE UP (ref 7–23)
CALCIUM SERPL-MCNC: 11 MG/DL — HIGH (ref 8.4–10.5)
CHLORIDE SERPL-SCNC: 108 MMOL/L — SIGNIFICANT CHANGE UP (ref 96–108)
CHOLEST SERPL-MCNC: 197 MG/DL — SIGNIFICANT CHANGE UP (ref 10–199)
CO2 SERPL-SCNC: 23 MMOL/L — SIGNIFICANT CHANGE UP (ref 22–31)
CREAT SERPL-MCNC: 0.51 MG/DL — SIGNIFICANT CHANGE UP (ref 0.5–1.3)
GLUCOSE SERPL-MCNC: 111 MG/DL — HIGH (ref 70–99)
HCG UR QL: NEGATIVE — SIGNIFICANT CHANGE UP
HCT VFR BLD CALC: 45.2 % — HIGH (ref 34.5–45)
HCT VFR BLD CALC: 45.8 % — HIGH (ref 34.5–45)
HDLC SERPL-MCNC: 59 MG/DL — SIGNIFICANT CHANGE UP
HGB BLD-MCNC: 14.3 G/DL — SIGNIFICANT CHANGE UP (ref 11.5–15.5)
HGB BLD-MCNC: 14.6 G/DL — SIGNIFICANT CHANGE UP (ref 11.5–15.5)
LIPID PNL WITH DIRECT LDL SERPL: 113 MG/DL — SIGNIFICANT CHANGE UP
MCHC RBC-ENTMCNC: 29.8 PG — SIGNIFICANT CHANGE UP (ref 27–34)
MCHC RBC-ENTMCNC: 30 PG — SIGNIFICANT CHANGE UP (ref 27–34)
MCHC RBC-ENTMCNC: 31.2 GM/DL — LOW (ref 32–36)
MCHC RBC-ENTMCNC: 32.4 GM/DL — SIGNIFICANT CHANGE UP (ref 32–36)
MCV RBC AUTO: 92.6 FL — SIGNIFICANT CHANGE UP (ref 80–100)
MCV RBC AUTO: 95.4 FL — SIGNIFICANT CHANGE UP (ref 80–100)
PLATELET # BLD AUTO: 285 K/UL — SIGNIFICANT CHANGE UP (ref 150–400)
PLATELET # BLD AUTO: 311 K/UL — SIGNIFICANT CHANGE UP (ref 150–400)
POTASSIUM SERPL-MCNC: 4.6 MMOL/L — SIGNIFICANT CHANGE UP (ref 3.5–5.3)
POTASSIUM SERPL-SCNC: 4.6 MMOL/L — SIGNIFICANT CHANGE UP (ref 3.5–5.3)
RBC # BLD: 4.8 M/UL — SIGNIFICANT CHANGE UP (ref 3.8–5.2)
RBC # BLD: 4.88 M/UL — SIGNIFICANT CHANGE UP (ref 3.8–5.2)
RBC # FLD: 13.1 % — SIGNIFICANT CHANGE UP (ref 10.3–14.5)
RBC # FLD: 14.3 % — SIGNIFICANT CHANGE UP (ref 10.3–14.5)
SODIUM SERPL-SCNC: 142 MMOL/L — SIGNIFICANT CHANGE UP (ref 135–145)
TOTAL CHOLESTEROL/HDL RATIO MEASUREMENT: 3.3 RATIO — SIGNIFICANT CHANGE UP (ref 3.3–7.1)
TRIGL SERPL-MCNC: 123 MG/DL — SIGNIFICANT CHANGE UP (ref 10–149)
WBC # BLD: 13.27 K/UL — HIGH (ref 3.8–10.5)
WBC # BLD: 16.6 K/UL — HIGH (ref 3.8–10.5)
WBC # FLD AUTO: 13.27 K/UL — HIGH (ref 3.8–10.5)
WBC # FLD AUTO: 16.6 K/UL — HIGH (ref 3.8–10.5)

## 2018-10-26 PROCEDURE — 99239 HOSP IP/OBS DSCHRG MGMT >30: CPT

## 2018-10-26 PROCEDURE — 99232 SBSQ HOSP IP/OBS MODERATE 35: CPT

## 2018-10-26 PROCEDURE — 93458 L HRT ARTERY/VENTRICLE ANGIO: CPT | Mod: 26,GC

## 2018-10-26 PROCEDURE — 93571 IV DOP VEL&/PRESS C FLO 1ST: CPT | Mod: 26,LD,GC

## 2018-10-26 PROCEDURE — 99152 MOD SED SAME PHYS/QHP 5/>YRS: CPT | Mod: GC

## 2018-10-26 RX ORDER — METOPROLOL TARTRATE 50 MG
0.5 TABLET ORAL
Qty: 30 | Refills: 0
Start: 2018-10-26 | End: 2018-11-24

## 2018-10-26 RX ORDER — NICOTINE POLACRILEX 2 MG
1 GUM BUCCAL
Qty: 30 | Refills: 0
Start: 2018-10-26 | End: 2018-11-24

## 2018-10-26 RX ORDER — AMLODIPINE BESYLATE 2.5 MG/1
1 TABLET ORAL
Qty: 30 | Refills: 0
Start: 2018-10-26 | End: 2018-11-24

## 2018-10-26 RX ORDER — ATORVASTATIN CALCIUM 80 MG/1
1 TABLET, FILM COATED ORAL
Qty: 30 | Refills: 0
Start: 2018-10-26 | End: 2018-11-24

## 2018-10-26 RX ORDER — MORPHINE SULFATE 50 MG/1
2 CAPSULE, EXTENDED RELEASE ORAL ONCE
Refills: 0 | Status: DISCONTINUED | OUTPATIENT
Start: 2018-10-26 | End: 2018-10-26

## 2018-10-26 RX ORDER — ASPIRIN/CALCIUM CARB/MAGNESIUM 324 MG
1 TABLET ORAL
Qty: 0 | Refills: 0 | DISCHARGE
Start: 2018-10-26

## 2018-10-26 RX ADMIN — AMLODIPINE BESYLATE 5 MILLIGRAM(S): 2.5 TABLET ORAL at 05:07

## 2018-10-26 RX ADMIN — Medication 81 MILLIGRAM(S): at 11:47

## 2018-10-26 RX ADMIN — Medication 12.5 MILLIGRAM(S): at 05:07

## 2018-10-26 RX ADMIN — MORPHINE SULFATE 2 MILLIGRAM(S): 50 CAPSULE, EXTENDED RELEASE ORAL at 07:32

## 2018-10-26 RX ADMIN — MORPHINE SULFATE 2 MILLIGRAM(S): 50 CAPSULE, EXTENDED RELEASE ORAL at 07:02

## 2018-10-26 RX ADMIN — SODIUM CHLORIDE 3 MILLILITER(S): 9 INJECTION INTRAMUSCULAR; INTRAVENOUS; SUBCUTANEOUS at 05:04

## 2018-10-26 NOTE — PROGRESS NOTE ADULT - SUBJECTIVE AND OBJECTIVE BOX
Brief Cardiology note:    CT-Coronary:  IMPRESSION:     CT coronary angiography shows:  LMCA: Tiny eccentric calcified plaque at the ostium resulting in no   associated luminal narrowing.  LAD: Eccentric noncalcified plaque within the proximal segment resulting   in moderate (60-70%) luminal narrowing.  LCx: Patent.  RCA: Dominant vessel, with noncalcified plaque within the proximal   segment resulting in minimal (less than 30%) luminal narrowing.      -CT-Coronary results noted. Eccentric lesion ~60-70% at prox LAD and myocardial bridging  -Patient with persistent chest pain (2/10) at this time  -Discussed with patient and will plan for LHC tomorrow AM.  -Please admit to hospital medicine  -Please start Atorvastatin 80 mg po QHS  -Cont ASA 81 mg po QD  -Start Metoprolol 12.5 mg po BID  -Start Norvasc 5 mg po QD  -Please order routine TTE  -Please keep NPO after midnight for LHC. Will discuss with interventional team.    Thank you for this interesting consult. My team will continue to follow along with you.    Rodolfo Calvin MD  Cardiology Attending  [facility] / [facility]   Cell: [phone]  (Cardiology Nocturnist cell number available 7 pm - 7 am every night; available daytime week days for follow-up only; daytime weekends covered by general cardiology consult service)
SUBJ: no further Cp, no SOB, plan for cardiac cath today    MEDICATIONS  (STANDING):  amLODIPine   Tablet 5 milliGRAM(s) Oral daily  aspirin enteric coated 81 milliGRAM(s) Oral daily  atorvastatin 80 milliGRAM(s) Oral daily  enoxaparin Injectable 40 milliGRAM(s) SubCutaneous every 24 hours  metoprolol tartrate 12.5 milliGRAM(s) Oral two times a day  nicotine - 21 mG/24Hr(s) Patch 1 patch Transdermal daily  sodium chloride 0.9% lock flush 3 milliLiter(s) IV Push every 8 hours    MEDICATIONS  (PRN):      Vital Signs Last 24 Hrs  T(C): 36.8 (26 Oct 2018 12:00), Max: 37.1 (25 Oct 2018 16:23)  T(F): 98.2 (26 Oct 2018 12:00), Max: 98.7 (25 Oct 2018 16:23)  HR: 73 (26 Oct 2018 12:00) (54 - 73)  BP: 123/81 (26 Oct 2018 12:00) (121/78 - 131/82)  BP(mean): --  RR: 18 (26 Oct 2018 12:00) (18 - 18)  SpO2: 97% (26 Oct 2018 12:00) (96% - 98%)    REVIEW OF SYSTEMS:  Cardiovascular:  See HPI.   chest pain-resolved,  No dyspnea,  No syncope,  No palpitations, + dizziness, No Orthopnea,      No Paroxsymal nocturnal dyspnea;  Respiratory:  No Dyspnea, No cough,     Genitourinary:  No dysuria, no hematuria; Gastrointestinal:  + nausea, no vomiting. No diarrhea.  No abdominal pain. No dark color stool, no melena ; Neurological: No headache, no dizziness, no slurred speech;  Psychiatric: No agitation, no anxiety.  ALL OTHER REVIEW OF SYSTEMS ARE NEGATIVE.          PHYSICAL EXAM:  · CONSTITUTIONAL: Well-developed, well nourished      · EYES: EOMI; PERRL; no drainage or redness  · NECK: No bruits; no thyromegaly or nodules  ·RESPIRATORY:   airway patent; breath sounds equal; good air movement; respirations non-labored; clear to auscultation bilaterally; no chest wall tenderness; no intercostal retractions; no rales,rhonchi or wheeze  · CARDIOVASCULAR: regular rate and rhythm  no rub  no murmur  normal PMI  . GASTROINTESTINAL:  no distention; no masses palpable; bowel sounds normal  · EXTREMITIES: No cyanosis, clubbing or edema  · VASCULAR:  Equal and normal pulses (radial, femoral)  ·NEUROLOGICAL:  Alert and oriented x 3; sensation intact; deep reflexes intact; cranial nerves intact; no spontaneous movement; superficial reflexes intact; normal strength  · SKIN: No lesions; no rash  . LYMPH NODES: No lymphadedenopathy  · MUSCULOSKELETAL:  No calf tenderness  no joint swelling	    TELEMETRY: SR no events      LABS:   CBC Full  -  ( 26 Oct 2018 07:58 )  WBC Count : 13.27 K/uL  Hemoglobin : 14.3 g/dL  Hematocrit : 45.8 %  Platelet Count - Automated : 311 K/uL  Mean Cell Volume : 95.4 fl  Mean Cell Hemoglobin : 29.8 pg  Mean Cell Hemoglobin Concentration : 31.2 gm/dL  Auto Neutrophil # : x  Auto Lymphocyte # : x  Auto Monocyte # : x  Auto Eosinophil # : x  Auto Basophil # : x  Auto Neutrophil % : x  Auto Lymphocyte % : x  Auto Monocyte % : x  Auto Eosinophil % : x  Auto Basophil % : x      10-26    142  |  108  |  18  ----------------------------<  111<H>  4.6   |  23  |  0.51    Ca    11.0<H>      26 Oct 2018 06:02    TPro  7.4  /  Alb  4.7  /  TBili  0.3  /  DBili  x   /  AST  16  /  ALT  12  /  AlkPhos  50  10-24          RADIOLOGY & ADDITIONAL STUDIES:    CT-Coronary:  IMPRESSION:     CT coronary angiography shows:  LMCA: Tiny eccentric calcified plaque at the ostium resulting in no   associated luminal narrowing.  LAD: Eccentric noncalcified plaque within the proximal segment resulting   in moderate (60-70%) luminal narrowing.  LCx: Patent.  RCA: Dominant vessel, with noncalcified plaque within the proximal   segment resulting in minimal (less than 30%) luminal narrowing.      IMPRESSION AND PLAN:    47yr old female no pmhx but is an active smoker for 30yrs 1 PPD presents c/o chest pain for last 4 days radiating to neck and jaw.  Found to have Eccentric lesion ~60-70% at prox LAD and myocardial bridging,. plan for cath today      CAD     continue Atorvastatin 80 mg po QHS  -Cont ASA 81 mg po QD  -Cont Metoprolol 12.5 mg po BID  -Cont Norvasc 5 mg po QD   Counselled on smoking cessation.   -for LHC today    please arrange outpatietn card followup in 2-6 weeks post diacharge x [phone]  Cardiology will follow    Patient is seen and examined with fellow, NP and the CCU house-staff. I agree with the history, physical and the assessment and plan.    Bart Lopes MD, PhD  Cardiology Attending  [facility]/ [facility]  [phone]    (Cardiology Nocturnist cell number available 7 pm - 7 am every night; available daytime week days for follow-up only; daytime weekends covered by general cardiology consult service)
cc: CP    Overnight w/ CP x 1, resolved spontaneously.   NSR 50-80 on tele w/ out ectopy.     Anxious on exam this AM, upset and insisting that cath be done asap.     REVIEW OF SYSTEMS:  CONSTITUTIONAL: No fever, weight loss, or fatigue  EYES: No eye pain, visual disturbances, or discharge  ENMT:  No difficulty hearing, tinnitus, vertigo; No sinus or throat pain  NECK: No pain or stiffness  RESPIRATORY: No cough, wheezing, chills or hemoptysis; + acute on chronic shortness of breath  CARDIOVASCULAR: +chest pain radiating to jaw and neck, no palpitations, dizziness, or leg swelling  GASTROINTESTINAL: No abdominal or epigastric pain. No nausea, vomiting, or hematemesis; No diarrhea or constipation. No melena or hematochezia.  GENITOURINARY: No dysuria, frequency, hematuria, or incontinence  NEUROLOGICAL: No headaches, memory loss, loss of strength, numbness, or tremors  SKIN: No itching, burning, rashes, or lesions   ENDOCRINE: No heat or cold intolerance; No hair loss  MUSCULOSKELETAL: No joint pain or swelling; No muscle, back, or extremity pain  PSYCHIATRIC: No depression, anxiety, mood swings, or difficulty sleeping  HEME/LYMPH: No easy bruising, or bleeding gums    T(C): 36.8 (10-26-18 @ 12:00), Max: 37.1 (10-25-18 @ 16:23)  HR: 73 (10-26-18 @ 12:00) (54 - 73)  BP: 123/81 (10-26-18 @ 12:00) (121/78 - 131/82)  RR: 18 (10-26-18 @ 12:00) (18 - 18)  SpO2: 97% (10-26-18 @ 12:00) (96% - 98%)  Wt(kg): --Vital Signs Last 24 Hrs  T(C): 36.8 (26 Oct 2018 12:00), Max: 37.1 (25 Oct 2018 16:23)  T(F): 98.2 (26 Oct 2018 12:00), Max: 98.7 (25 Oct 2018 16:23)  HR: 73 (26 Oct 2018 12:00) (54 - 73)  BP: 123/81 (26 Oct 2018 12:00) (121/78 - 131/82)  BP(mean): --  RR: 18 (26 Oct 2018 12:00) (18 - 18)  SpO2: 97% (26 Oct 2018 12:00) (96% - 98%)    PHYSICAL EXAM:  GENERAL: NAD, well-groomed  HEAD:  Atraumatic, Normocephalic  EYES: EOMI, conjunctiva and sclera clear  NECK: Supple, No JVD  NERVOUS SYSTEM:  Alert & Oriented X3, Good concentration; Motor Strength 5/5 B/L upper and lower extremities  CHEST/LUNG: Clear to percussion bilaterally; No rales, rhonchi, wheezing  HEART: Regular rate and rhythm; 2/6 systolic murmur  ABDOMEN: Soft, Nontender, Nondistended; Bowel sounds present  EXTREMITIES:  2+ Peripheral Pulses, No clubbing, cyanosis, or edema  SKIN: No rashes or lesions    Consultant(s) Notes Reviewed:  [x ] YES  [ ] NO    LABS:  HGB 15.8-->14.  WBC 10-->13.27  Calcium 11.                        14.3   13.27 )-----------( 311      ( 26 Oct 2018 07:58 )             45.8     10-26    142  |  108  |  18  ----------------------------<  111<H>  4.6   |  23  |  0.51    Ca    11.0<H>      26 Oct 2018 06:02    TPro  7.4  /  Alb  4.7  /  TBili  0.3  /  DBili  x   /  AST  16  /  ALT  12  /  AlkPhos  50  10-24    PT/INR - ( 24 Oct 2018 16:05 )   PT: 9.7 sec;   INR: 0.89 ratio         PTT - ( 24 Oct 2018 16:05 )  PTT:30.5 sec    CAPILLARY BLOOD GLUCOSE    RADIOLOGY & ADDITIONAL TESTS:  Imaging Personally Reviewed: yes   Care discussed w/ other providers: yes--  Consultant notes reviewed: yes    Echo:     Mitral Valve: Normal mitral valve. Minimal mitral  regurgitation.  Aortic Valve/Aorta: Normal trileaflet aortic valve.  Aortic Root: 2.8 cm.  Left Atrium: Normal left atrium.  LA volume index = 26  cc/m2.  Left Ventricle: Normal left ventricular systolic function.  No segmental wall motion abnormalities. Normal left  ventricular internal dimensions and wall thicknesses.  Normal diastolic function  Right Heart: Normal right atrium. Normal right ventricular  size and function. Normal tricuspid valve. Mild tricuspid  regurgitation. Normal pulmonic valve.  Pericardium/Pleura: Normal pericardium with no pericardial  effusion.  Hemodynamic: Estimated right atrial pressure is 8 mm Hg.  Estimated right ventricular systolic pressure equals 29 mm  Hg, assuming right atrial pressure equals 8 mm Hg,  consistent with normal pulmonary pressures.  ------------------------------------------------------------------------  Conclusions:  1. Normal left ventricular internal dimensions and wall  thicknesses.  2. Normal left ventricular systolic function. No segmental  wall motion abnormalities.  CT coronaries: IMPRESSION:   CT coronary angiography shows:  LMCA: Tiny eccentric calcified plaque at the ostium resulting in no   associated luminal narrowing.  LAD: Eccentric noncalcified plaque within the proximal segment resulting   in moderate (60-70%) luminal narrowing.  LCx: Patent.  RCA: Dominant vessel, with noncalcified plaque within the proximal   segment resulting in minimal (less than 30%) luminal narrowing.

## 2018-10-26 NOTE — DISCHARGE NOTE ADULT - ADDITIONAL INSTRUCTIONS
Make appointments to follow up with your out patient physicians.  Bring all discharge paperwork including discharge medication list to your follow up appointments. Make appointments to follow up with your out patient physicians.  Bring all discharge paperwork including discharge medication list to your follow up appointments.  Please call for outpatient card followup in 2-6 weeks post discharge [phone]

## 2018-10-26 NOTE — DISCHARGE NOTE ADULT - PATIENT PORTAL LINK FT
You can access the [facility] Patient Portal, offered by [facility], by registering with the following website: [email or web address]

## 2018-10-26 NOTE — DISCHARGE NOTE ADULT - CARE PLAN
Principal Discharge DX:	Chest pain  Goal:	Remain without chest pain  Assessment and plan of treatment:	HOME CARE INSTRUCTIONS  For the next few days, avoid physical activities that bring on chest pain. Continue physical activities as directed.  Do not smoke.  Avoid drinking alcohol.   Only take over-the-counter or prescription medicine for pain, discomfort, or fever as directed by your caregiver.  Follow your caregiver's suggestions for further testing if your chest pain does not go away.  Keep any follow-up appointments you made. If you do not go to an appointment, you could develop lasting (chronic) problems with pain. If there is any problem keeping an appointment, you must call to reschedule.   SEEK MEDICAL CARE IF:  You think you are having problems from the medicine you are taking. Read your medicine instructions carefully.  Your chest pain does not go away, even after treatment.  You develop a rash with blisters on your chest.  SEEK IMMEDIATE MEDICAL CARE IF:  You have increased chest pain or pain that spreads to your arm, neck, jaw, back, or abdomen.   You develop shortness of breath, an increasing cough, or you are coughing up blood.  You have severe back or abdominal pain, feel nauseous, or vomit.  You develop severe weakness, fainting, or chills.  You have a fever.  THIS IS AN EMERGENCY. Do not wait to see if the pain will go away. Get medical help at once. Call your local emergency services. Do not drive yourself to the hospital.  Secondary Diagnosis:	CAD (coronary artery disease)  Assessment and plan of treatment:	Coronary artery disease is a condition where the arteries the supply the heart muscle get clogged with fatty deposits & puts you at risk for a heart attack  Call your doctor if you have any new pain, pressure, or discomfort in the center of your chest, pain, tingling or discomfort in arms, back, neck, jaw, or stomach, shortness of breath, nausea, vomiting, burping or heartburn, sweating, cold and clammy skin, racing or abnormal heartbeat for more than 10 minutes or if they keep coming & going.  Call 911 and do not tr to get to hospital by care  You can help yourself with lifestyle changes (quitting smoking if you smoke), eat lots of fruits & vegetables & low fat dairy products, not a lot of meat & fatty foods, walk or some form of physical activity most days of the week, lose weight if you are overweight  Take your cardiac medication as prescribed to lower cholesterol, to lower blood pressure, aspirin to prevent blood clots, and diabetes control  Make sure to keep appointments with doctor for cardiac follow up care  Secondary Diagnosis:	Smoker  Assessment and plan of treatment:	Informed pt of the various negative side effects of smoking including risk of COPD, Lung Ca etc  Strongly recommended that pt stops smoking and pt given various options of smoking cessation tools such as NRT's and other pharmacotherapies  Secondary Diagnosis:	Elevated blood pressure reading  Assessment and plan of treatment:	Low salt diet  Activity as tolerated.  Take all medication as prescribed.  Follow up with your medical doctor for routine blood pressure monitoring at your next visit.  Notify your doctor if you have any of the following symptoms:   Dizziness, Lightheadedness, Blurry vision, Headache, Chest pain, Shortness of breath

## 2018-10-26 NOTE — PROGRESS NOTE ADULT - ATTENDING COMMENTS
dispo this evening assuming repeat CBC is stable.   d/c planning time 35 minutes. see d/c sum for details.

## 2018-10-26 NOTE — DISCHARGE NOTE ADULT - HOSPITAL COURSE
47yr old female no pmhx but is an active smoker for 30yrs 1 PPD presents c/o chest pain for last 4 days radiating to neck and jaw.  Found to have Eccentric lesion ~60-70% at prox LAD and myocardial bridging,. S/P cath, diagnostic.      continue Atorvastatin 80 mg po QHS  -Cont ASA 81 mg po QD  -Cont Metoprolol 12.5 mg po BID  -Cont Norvasc 5 mg po QD   Counselled on smoking cessation.   -for C today    please arrange outpatient card followup in 2-6 weeks post discharge x [phone]  Cardiology will follow Ms. Cullen is a 48 y/o woman w/ no significant PMH but active smoker (1 PPD) who p/w stable angina and found to have 70% LAD lesion on CT coronaries. CP prior to admission was non-exertional though radiating to neck/jaw b/l. Now s/p cath, non obstructive LAD disease w/ FFR .9, no stent deployed. Discussed w/ cardiology, ok for d/c on medical therapy w/ close f/u. Counseled on smoking cessation, offered medical assistance w/ nicotine replacement and chantix, she prefers to f/u w/ pcp for this. CBC this AM w/ hgb drop, on repeat prior to d/c HGB is stable w/ no active bleeding. Leukocytosis mild and nonspecific, HD stable afebrile w/ no localizing e/o infection at this point. Most likely reactive in setting of cath. Strict RTC precautions given, needs to f/u w/ PCP w/ in 1 week of d/c to f/u on leukocytosis, calcium, smoking. All questions and concerns were addresed, at time of d/c she was feeling well and eager to go home.

## 2018-10-26 NOTE — PROGRESS NOTE ADULT - ASSESSMENT
Ms. Cullen is a 46 y/o woman w/ no significant PMH but active smoker (1 PPD) who p/w stable angina and found to have 70% LAD lesion on CT coronaries. CP prior to admission was non-exertional though radiating to neck/jaw b/l. Now s/p cath, non obstructive LAD disease w/ FFR .9, no stent deployed. Discussed w/ cardiology, ok for d/c tonight on medical therapy w/ close f/u. Counseled on smoking cessation, offered medical assistance w/ nicotine replacement and chantix, she prefers to f/u w/ pcp for this. CBC this AM w/ hgb drop, will repeat prior to d/c to ensure HGB stable. Leukocytosis mild and nonspecific, HD stable afebrile w/ no localizing e/o infection at this point.

## 2018-10-26 NOTE — DISCHARGE NOTE ADULT - CARE PROVIDER_API CALL
Tessy,   Phone: (   )    -  Fax: (   )    -    Bart Lopes (MD; PhD), Medicine  Cardiology  [address] [place] [id number]  Phone: [phone]  Fax: [phone]

## 2018-10-26 NOTE — PROGRESS NOTE ADULT - PROBLEM SELECTOR PLAN 4
Patient active smoker. She is contemplating quitting and interested in having more resources to quit.   - Start nicotine patch 21 mg/24 hr  - will offer on d/c

## 2018-10-26 NOTE — DISCHARGE NOTE ADULT - MEDICATION SUMMARY - MEDICATIONS TO TAKE
I will START or STAY ON the medications listed below when I get home from the hospital:    aspirin 81 mg oral delayed release tablet  -- 1 tab(s) by mouth once a day  -- Indication: For CAD (coronary artery disease)    atorvastatin 80 mg oral tablet  -- 1 tab(s) by mouth once a day  -- Indication: For CAD (coronary artery disease)    metoprolol tartrate 25 mg oral tablet  -- 0.5 tab(s) by mouth 2 times a day   -- It is very important that you take or use this exactly as directed.  Do not skip doses or discontinue unless directed by your doctor.  May cause drowsiness.  Alcohol may intensify this effect.  Use care when operating dangerous machinery.  Some non-prescription drugs may aggravate your condition.  Read all labels carefully.  If a warning appears, check with your doctor before taking.  Take with food or milk.  This drug may impair the ability to drive or operate machinery.  Use care until you become familiar with its effects.    -- Indication: For Chest pain    amLODIPine 5 mg oral tablet  -- 1 tab(s) by mouth once a day  -- Indication: For Htn    nicotine 21 mg/24 hr transdermal film, extended release  -- 1 patch by transdermal patch once a day   -- Indication: For Smoker

## 2018-10-26 NOTE — DISCHARGE NOTE ADULT - PLAN OF CARE
Informed pt of the various negative side effects of smoking including risk of COPD, Lung Ca etc  Strongly recommended that pt stops smoking and pt given various options of smoking cessation tools such as NRT's and other pharmacotherapies Coronary artery disease is a condition where the arteries the supply the heart muscle get clogged with fatty deposits & puts you at risk for a heart attack  Call your doctor if you have any new pain, pressure, or discomfort in the center of your chest, pain, tingling or discomfort in arms, back, neck, jaw, or stomach, shortness of breath, nausea, vomiting, burping or heartburn, sweating, cold and clammy skin, racing or abnormal heartbeat for more than 10 minutes or if they keep coming & going.  Call 911 and do not tr to get to hospital by care  You can help yourself with lifestyle changes (quitting smoking if you smoke), eat lots of fruits & vegetables & low fat dairy products, not a lot of meat & fatty foods, walk or some form of physical activity most days of the week, lose weight if you are overweight  Take your cardiac medication as prescribed to lower cholesterol, to lower blood pressure, aspirin to prevent blood clots, and diabetes control  Make sure to keep appointments with doctor for cardiac follow up care HOME CARE INSTRUCTIONS  For the next few days, avoid physical activities that bring on chest pain. Continue physical activities as directed.  Do not smoke.  Avoid drinking alcohol.   Only take over-the-counter or prescription medicine for pain, discomfort, or fever as directed by your caregiver.  Follow your caregiver's suggestions for further testing if your chest pain does not go away.  Keep any follow-up appointments you made. If you do not go to an appointment, you could develop lasting (chronic) problems with pain. If there is any problem keeping an appointment, you must call to reschedule.   SEEK MEDICAL CARE IF:  You think you are having problems from the medicine you are taking. Read your medicine instructions carefully.  Your chest pain does not go away, even after treatment.  You develop a rash with blisters on your chest.  SEEK IMMEDIATE MEDICAL CARE IF:  You have increased chest pain or pain that spreads to your arm, neck, jaw, back, or abdomen.   You develop shortness of breath, an increasing cough, or you are coughing up blood.  You have severe back or abdominal pain, feel nauseous, or vomit.  You develop severe weakness, fainting, or chills.  You have a fever.  THIS IS AN EMERGENCY. Do not wait to see if the pain will go away. Get medical help at once. Call your local emergency services. Do not drive yourself to the hospital. Low salt diet  Activity as tolerated.  Take all medication as prescribed.  Follow up with your medical doctor for routine blood pressure monitoring at your next visit.  Notify your doctor if you have any of the following symptoms:   Dizziness, Lightheadedness, Blurry vision, Headache, Chest pain, Shortness of breath Remain without chest pain

## 2018-10-26 NOTE — DISCHARGE NOTE ADULT - CARE PROVIDERS DIRECT ADDRESSES
,DirectAddress_Unknown,[email or web address]

## 2018-10-30 PROBLEM — Z00.00 ENCOUNTER FOR PREVENTIVE HEALTH EXAMINATION: Status: ACTIVE | Noted: 2018-10-30

## 2018-11-05 ENCOUNTER — APPOINTMENT (OUTPATIENT)
Dept: HEART AND VASCULAR | Facility: CLINIC | Age: 48
End: 2018-11-05
Payer: MEDICAID

## 2018-11-05 VITALS
TEMPERATURE: 98 F | HEART RATE: 72 BPM | SYSTOLIC BLOOD PRESSURE: 115 MMHG | OXYGEN SATURATION: 95 % | DIASTOLIC BLOOD PRESSURE: 65 MMHG

## 2018-11-05 DIAGNOSIS — I25.10 ATHEROSCLEROTIC HEART DISEASE OF NATIVE CORONARY ARTERY W/OUT ANGINA PECTORIS: ICD-10-CM

## 2018-11-05 PROCEDURE — 99213 OFFICE O/P EST LOW 20 MIN: CPT

## 2018-11-05 RX ORDER — AMLODIPINE BESYLATE 5 MG/1
5 TABLET ORAL
Refills: 0 | Status: DISCONTINUED | COMMUNITY

## 2018-11-05 RX ORDER — ASPIRIN 81 MG
81 TABLET, DELAYED RELEASE (ENTERIC COATED) ORAL
Refills: 0 | Status: ACTIVE | COMMUNITY

## 2018-11-05 RX ORDER — METOPROLOL SUCCINATE 25 MG/1
25 TABLET, EXTENDED RELEASE ORAL
Qty: 90 | Refills: 3 | Status: DISCONTINUED | COMMUNITY
Start: 2018-11-05 | End: 2018-11-05

## 2018-11-05 RX ORDER — AMLODIPINE BESYLATE 5 MG/1
5 TABLET ORAL DAILY
Qty: 90 | Refills: 3 | Status: COMPLETED | COMMUNITY
Start: 2018-11-05

## 2018-11-14 RX ORDER — ATORVASTATIN CALCIUM 80 MG/1
80 TABLET, FILM COATED ORAL
Qty: 90 | Refills: 3 | Status: DISCONTINUED | COMMUNITY
Start: 2018-11-05 | End: 2018-11-14

## 2019-02-21 ENCOUNTER — MEDICATION RENEWAL (OUTPATIENT)
Age: 49
End: 2019-02-21

## 2019-03-04 ENCOUNTER — APPOINTMENT (OUTPATIENT)
Dept: HEART AND VASCULAR | Facility: CLINIC | Age: 49
End: 2019-03-04
Payer: MEDICAID

## 2019-03-04 VITALS — SYSTOLIC BLOOD PRESSURE: 144 MMHG | DIASTOLIC BLOOD PRESSURE: 88 MMHG | HEART RATE: 81 BPM | OXYGEN SATURATION: 96 %

## 2019-03-04 VITALS — OXYGEN SATURATION: 96 % | SYSTOLIC BLOOD PRESSURE: 144 MMHG | HEART RATE: 81 BPM | DIASTOLIC BLOOD PRESSURE: 88 MMHG

## 2019-03-04 PROCEDURE — 99212 OFFICE O/P EST SF 10 MIN: CPT

## 2019-03-04 RX ORDER — ATORVASTATIN CALCIUM 80 MG/1
80 TABLET, FILM COATED ORAL
Qty: 90 | Refills: 3 | Status: DISCONTINUED | COMMUNITY
Start: 2018-11-05 | End: 2019-03-04

## 2019-04-26 NOTE — ED ADULT NURSE NOTE - CHIEF COMPLAINT
"  Physical Therapy Daily Treatment Note     Name: Martha Sevilla  Clinic Number: [id number]    Therapy Diagnosis:   Encounter Diagnoses   Name Primary?    Chronic pain of left knee     Decreased range of motion (ROM) of left knee     Decreased strength of lower extremity     Impaired gait and mobility      Physician: Niraj Pérez MD    Visit Date: 4/26/2019    Physician Orders: PT Eval and Treat   Medical Diagnosis from Referral:   M17.12 (ICD-10-CM) - Arthritis of knee, left   Z96.652 (ICD-10-CM) - History of left knee replacement   Evaluation Date: 4/3/2019  Authorization Period Expiration: 3/18/2020  Plan of Care Expiration: 5/31/2019  Visit # / Visits authorized: 10/50  FOTO: 10/10 NEXT      Time In: 11:02 am   Time Out: 11:48 am   Total Billable Time: 41 minutes    Precautions: Standard, fall    Subjective     Pt reports: that she has been walking within her home with SPC. Pt stated that she had to go to a wake yesterday so did more standing/walking than she is used to.   She was compliant with home exercise program.  Response to previous treatment: no adverse effects  Functional change: none    Pain: 6/10  Location: left knee      Objective     Martha received therapeutic exercises to develop strength, ROM and flexibility for 41 minutes including ambulating one lap in clinic with SPC and  below and bike x 5' supervised.      Stationary bike for 5 minutes with for increased knee flexion ROM     - quad sets: 5" 2x10 L c/ towel roll under knee; verbal and tactile cues for isolated quad activation  - SLR: 2x15 L  - heel slides with strap:   - long arc quads: 3# 3x10 L     - fitter gastroc stretch: 3x30" double leg  - stair hamstring stretch: 3x30" L  - TKE: OTC 2x10 3" hold  - step ups: 4" step 2x15 L  - mini squats: 2x10  - steamboats 2x10       Home Exercises Provided and Patient Education Provided     Education provided:   - Continue with HEP     Written Home Exercises Provided: Patient instructed " The patient is a 46y Female complaining of to cont prior HEP.  Exercises were reviewed and Martha was able to demonstrate them prior to the end of the session.  Martha demonstrated good  understanding of the education provided.     See EMR under Patient Instructions for exercises provided 4/3/19.      Assessment     Pt received VC to look forward instead of down when ambulating with SPC. Pt demo/verbalized understanding. Pt was able to tolerate therapy session without any adverse reactions.   Martha is progressing well towards her goals.   Pt prognosis is Good.     Pt will continue to benefit from skilled outpatient physical therapy to address the deficits listed in the problem list box on initial evaluation, provide pt/family education and to maximize pt's level of independence in the home and community environment.     Pt's spiritual, cultural and educational needs considered and pt agreeable to plan of care and goals.    Anticipated barriers to physical therapy: comorbidities     Goals:     Short Term Goals:  1. Pt will be independent with HEP supplement PT in improving functional mobility. PROGRESSING, NOT 04/22/2019   2. Pt will improve L LE strength to at least 75% of R LE strength as measured via Micro[employer]et handheld dynamometer in order to improve functional mobility PROGRESSING, NOT 04/22/2019   3. Pt will improve L knee AROM to at least 5-100 in order to improve gait PROGRESSING, NOT 04/22/2019   Long Term Goals:  1. Pt will improve L LE strength to at least 90% of R LE strength as measured via Micro[employer]et handheld dynamometer in order to improve functional mobility PROGRESSING, NOT 04/22/2019   2. Pt will improve L knee AROM to at least 3-120 in order to improve gait and ability to perform ADLs PROGRESSING, NOT 04/22/2019   3. Pt will improve FOTO knee survey score to </= 44% limited in order to demo improved functional mobility PROGRESSING, NOT 04/22/2019   4. Pt will perform TUG in < 10 seconds without AD in order to demo improved gait speed  PROGRESSING, NOT 04/22/2019   5. Pt will perform at least 10 sit to stands without UE support on 30 second sit to stand test in order to demo improved ability to perform transfers PROGRESSING, NOT 04/22/2019     Plan     Continue per POC, progress as tolerated; Gait training outside next    Yudi Paez, PT

## 2019-05-09 ENCOUNTER — APPOINTMENT (OUTPATIENT)
Dept: ORTHOPEDIC SURGERY | Facility: CLINIC | Age: 49
End: 2019-05-09
Payer: MEDICAID

## 2019-05-09 VITALS
WEIGHT: 172 LBS | DIASTOLIC BLOOD PRESSURE: 85 MMHG | SYSTOLIC BLOOD PRESSURE: 135 MMHG | HEIGHT: 64 IN | HEART RATE: 74 BPM | BODY MASS INDEX: 29.37 KG/M2

## 2019-05-09 DIAGNOSIS — Z72.3 LACK OF PHYSICAL EXERCISE: ICD-10-CM

## 2019-05-09 DIAGNOSIS — Z78.9 OTHER SPECIFIED HEALTH STATUS: ICD-10-CM

## 2019-05-09 DIAGNOSIS — F17.200 NICOTINE DEPENDENCE, UNSPECIFIED, UNCOMPLICATED: ICD-10-CM

## 2019-05-09 PROCEDURE — 73562 X-RAY EXAM OF KNEE 3: CPT | Mod: LT

## 2019-05-09 PROCEDURE — 99204 OFFICE O/P NEW MOD 45 MIN: CPT

## 2019-05-09 RX ORDER — OXYCODONE AND ACETAMINOPHEN 5; 325 MG/1; MG/1
5-325 TABLET ORAL
Qty: 45 | Refills: 0 | Status: ACTIVE | COMMUNITY
Start: 2019-05-09 | End: 1900-01-01

## 2019-05-09 RX ORDER — TRAMADOL HYDROCHLORIDE 50 MG/1
50 TABLET, COATED ORAL 3 TIMES DAILY
Qty: 90 | Refills: 0 | Status: ACTIVE | COMMUNITY
Start: 2019-05-09 | End: 1900-01-01

## 2019-05-20 ENCOUNTER — FORM ENCOUNTER (OUTPATIENT)
Age: 49
End: 2019-05-20

## 2019-05-21 ENCOUNTER — APPOINTMENT (OUTPATIENT)
Dept: CT IMAGING | Facility: CLINIC | Age: 49
End: 2019-05-21
Payer: MEDICAID

## 2019-05-21 ENCOUNTER — TRANSCRIPTION ENCOUNTER (OUTPATIENT)
Age: 49
End: 2019-05-21

## 2019-05-21 ENCOUNTER — OUTPATIENT (OUTPATIENT)
Dept: OUTPATIENT SERVICES | Facility: HOSPITAL | Age: 49
LOS: 1 days | End: 2019-05-21
Payer: MEDICAID

## 2019-05-21 DIAGNOSIS — Z00.8 ENCOUNTER FOR OTHER GENERAL EXAMINATION: ICD-10-CM

## 2019-05-21 PROCEDURE — 76376 3D RENDER W/INTRP POSTPROCES: CPT | Mod: 26

## 2019-05-21 PROCEDURE — 73700 CT LOWER EXTREMITY W/O DYE: CPT | Mod: 26,LT

## 2019-05-24 ENCOUNTER — APPOINTMENT (OUTPATIENT)
Dept: ORTHOPEDIC SURGERY | Facility: CLINIC | Age: 49
End: 2019-05-24
Payer: MEDICAID

## 2019-05-24 VITALS
HEART RATE: 66 BPM | SYSTOLIC BLOOD PRESSURE: 118 MMHG | WEIGHT: 172 LBS | DIASTOLIC BLOOD PRESSURE: 77 MMHG | BODY MASS INDEX: 29.37 KG/M2 | HEIGHT: 64 IN

## 2019-05-24 PROCEDURE — 99214 OFFICE O/P EST MOD 30 MIN: CPT

## 2019-05-24 RX ORDER — HYDROCODONE BITARTRATE AND ACETAMINOPHEN 7.5; 325 MG/1; MG/1
7.5-325 TABLET ORAL
Qty: 84 | Refills: 0 | Status: ACTIVE | COMMUNITY
Start: 2019-05-24 | End: 1900-01-01

## 2019-05-30 ENCOUNTER — APPOINTMENT (OUTPATIENT)
Dept: ORTHOPEDIC SURGERY | Facility: CLINIC | Age: 49
End: 2019-05-30

## 2019-06-14 ENCOUNTER — APPOINTMENT (OUTPATIENT)
Dept: ORTHOPEDIC SURGERY | Facility: CLINIC | Age: 49
End: 2019-06-14
Payer: MEDICAID

## 2019-06-14 VITALS
SYSTOLIC BLOOD PRESSURE: 129 MMHG | HEIGHT: 64 IN | HEART RATE: 75 BPM | BODY MASS INDEX: 29.37 KG/M2 | DIASTOLIC BLOOD PRESSURE: 83 MMHG | WEIGHT: 172 LBS

## 2019-06-14 PROCEDURE — 73562 X-RAY EXAM OF KNEE 3: CPT | Mod: LT

## 2019-06-14 PROCEDURE — 99213 OFFICE O/P EST LOW 20 MIN: CPT

## 2019-06-14 RX ORDER — GABAPENTIN 100 MG/1
100 CAPSULE ORAL 3 TIMES DAILY
Qty: 90 | Refills: 0 | Status: ACTIVE | COMMUNITY
Start: 2019-06-14 | End: 1900-01-01

## 2019-06-14 RX ORDER — HYDROCODONE BITARTRATE AND ACETAMINOPHEN 7.5; 325 MG/1; MG/1
7.5-325 TABLET ORAL
Qty: 84 | Refills: 0 | Status: ACTIVE | COMMUNITY
Start: 2019-06-14 | End: 1900-01-01

## 2019-06-14 RX ORDER — DICLOFENAC SODIUM 75 MG/1
75 TABLET, DELAYED RELEASE ORAL
Qty: 1 | Refills: 1 | Status: ACTIVE | COMMUNITY
Start: 2019-06-14 | End: 1900-01-01

## 2019-07-05 RX ORDER — HYDROCODONE BITARTRATE AND ACETAMINOPHEN 7.5; 325 MG/1; MG/1
7.5-325 TABLET ORAL
Qty: 84 | Refills: 0 | Status: ACTIVE | COMMUNITY
Start: 2019-07-05 | End: 1900-01-01

## 2019-07-19 ENCOUNTER — APPOINTMENT (OUTPATIENT)
Dept: ORTHOPEDIC SURGERY | Facility: CLINIC | Age: 49
End: 2019-07-19
Payer: MEDICAID

## 2019-07-19 VITALS
DIASTOLIC BLOOD PRESSURE: 77 MMHG | WEIGHT: 158 LBS | HEIGHT: 64 IN | BODY MASS INDEX: 26.98 KG/M2 | HEART RATE: 70 BPM | SYSTOLIC BLOOD PRESSURE: 126 MMHG

## 2019-07-19 PROCEDURE — 99213 OFFICE O/P EST LOW 20 MIN: CPT

## 2019-07-19 PROCEDURE — 73562 X-RAY EXAM OF KNEE 3: CPT | Mod: LT

## 2019-07-19 RX ORDER — HYDROCODONE BITARTRATE AND ACETAMINOPHEN 7.5; 325 MG/1; MG/1
7.5-325 TABLET ORAL
Qty: 84 | Refills: 0 | Status: ACTIVE | COMMUNITY
Start: 2019-07-19 | End: 1900-01-01

## 2019-08-05 ENCOUNTER — APPOINTMENT (OUTPATIENT)
Dept: HEART AND VASCULAR | Facility: CLINIC | Age: 49
End: 2019-08-05

## 2019-08-11 ENCOUNTER — RX RENEWAL (OUTPATIENT)
Age: 49
End: 2019-08-11

## 2019-08-11 RX ORDER — HYDROCODONE BITARTRATE AND ACETAMINOPHEN 7.5; 325 MG/1; MG/1
7.5-325 TABLET ORAL
Qty: 42 | Refills: 0 | Status: ACTIVE | COMMUNITY
Start: 2019-08-11 | End: 1900-01-01

## 2019-08-30 ENCOUNTER — APPOINTMENT (OUTPATIENT)
Dept: ORTHOPEDIC SURGERY | Facility: CLINIC | Age: 49
End: 2019-08-30
Payer: MEDICAID

## 2019-08-30 VITALS
DIASTOLIC BLOOD PRESSURE: 82 MMHG | WEIGHT: 158 LBS | SYSTOLIC BLOOD PRESSURE: 127 MMHG | BODY MASS INDEX: 26.98 KG/M2 | HEART RATE: 74 BPM | HEIGHT: 64 IN

## 2019-08-30 DIAGNOSIS — S82.143A DISPLACED BICONDYLAR FRACTURE OF UNSPECIFIED TIBIA, INITIAL ENCOUNTER FOR CLOSED FRACTURE: ICD-10-CM

## 2019-08-30 PROCEDURE — 73562 X-RAY EXAM OF KNEE 3: CPT | Mod: LT

## 2019-08-30 PROCEDURE — 99214 OFFICE O/P EST MOD 30 MIN: CPT

## 2019-08-30 RX ORDER — TRAMADOL HYDROCHLORIDE 50 MG/1
50 TABLET, COATED ORAL 3 TIMES DAILY
Qty: 21 | Refills: 0 | Status: ACTIVE | COMMUNITY
Start: 2019-08-30 | End: 1900-01-01

## 2019-08-30 RX ORDER — HYDROCODONE BITARTRATE AND ACETAMINOPHEN 7.5; 325 MG/1; MG/1
7.5-325 TABLET ORAL
Qty: 90 | Refills: 0 | Status: ACTIVE | COMMUNITY
Start: 2019-08-30 | End: 1900-01-01

## 2019-09-09 ENCOUNTER — APPOINTMENT (OUTPATIENT)
Dept: HEART AND VASCULAR | Facility: CLINIC | Age: 49
End: 2019-09-09
Payer: MEDICAID

## 2019-09-09 VITALS
OXYGEN SATURATION: 98 % | WEIGHT: 178.1 LBS | SYSTOLIC BLOOD PRESSURE: 136 MMHG | BODY MASS INDEX: 30.57 KG/M2 | DIASTOLIC BLOOD PRESSURE: 83 MMHG | HEART RATE: 75 BPM

## 2019-09-09 PROCEDURE — 99214 OFFICE O/P EST MOD 30 MIN: CPT

## 2019-09-10 RX ORDER — ROSUVASTATIN CALCIUM 10 MG/1
10 TABLET, FILM COATED ORAL
Qty: 90 | Refills: 3 | Status: ACTIVE | COMMUNITY
Start: 1900-01-01 | End: 1900-01-01

## 2019-09-10 RX ORDER — FLUTICASONE PROPIONATE 100 UG/1
100 POWDER, METERED RESPIRATORY (INHALATION) TWICE DAILY
Qty: 1 | Refills: 3 | Status: ACTIVE | COMMUNITY
Start: 1900-01-01 | End: 1900-01-01

## 2019-09-10 RX ORDER — ATORVASTATIN CALCIUM 20 MG/1
20 TABLET, FILM COATED ORAL DAILY
Qty: 90 | Refills: 3 | Status: DISCONTINUED | COMMUNITY
Start: 2019-03-04 | End: 2019-09-10

## 2019-10-11 ENCOUNTER — APPOINTMENT (OUTPATIENT)
Dept: ORTHOPEDIC SURGERY | Facility: CLINIC | Age: 49
End: 2019-10-11

## 2020-07-31 RX ORDER — AMLODIPINE BESYLATE 5 MG/1
5 TABLET ORAL DAILY
Qty: 90 | Refills: 3 | Status: ACTIVE | COMMUNITY
Start: 2018-11-05 | End: 1900-01-01

## 2020-08-17 ENCOUNTER — APPOINTMENT (OUTPATIENT)
Dept: HEART AND VASCULAR | Facility: CLINIC | Age: 50
End: 2020-08-17

## 2024-05-15 NOTE — ED ADULT NURSE REASSESSMENT NOTE - NEURO WDL
Alert and oriented to person, place and time, memory intact, behavior appropriate to situation, PERRL. Patient/Caregiver provided printed discharge information.

## 2024-11-08 NOTE — ED ADULT NURSE NOTE - NS ED NURSE ELOPE COMMENTS
pt left without telling anybody Detail Level: Zone Include Location In Plan?: No Detail Level: Detailed